# Patient Record
Sex: FEMALE | Race: WHITE | NOT HISPANIC OR LATINO | URBAN - METROPOLITAN AREA
[De-identification: names, ages, dates, MRNs, and addresses within clinical notes are randomized per-mention and may not be internally consistent; named-entity substitution may affect disease eponyms.]

---
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
## 2023-08-08 PROBLEM — J45.909 UNSPECIFIED ASTHMA, UNCOMPLICATED: Chronic | Status: ACTIVE | Noted: 2019-01-28

## 2023-08-08 PROBLEM — K25.9 GASTRIC ULCER, UNSPECIFIED AS ACUTE OR CHRONIC, WITHOUT HEMORRHAGE OR PERFORATION: Chronic | Status: ACTIVE | Noted: 2019-01-28

## 2023-08-18 ENCOUNTER — APPOINTMENT (OUTPATIENT)
Dept: OBGYN | Facility: CLINIC | Age: 23
End: 2023-08-18

## 2023-09-05 ENCOUNTER — APPOINTMENT (OUTPATIENT)
Dept: OBGYN | Facility: CLINIC | Age: 23
End: 2023-09-05
Payer: COMMERCIAL

## 2023-09-05 DIAGNOSIS — K25.9 GASTRIC ULCER, UNSPECIFIED AS ACUTE OR CHRONIC, W/OUT HEMORRHAGE OR PERFORATION: ICD-10-CM

## 2023-09-05 DIAGNOSIS — Z78.9 OTHER SPECIFIED HEALTH STATUS: ICD-10-CM

## 2023-09-05 DIAGNOSIS — F50.9 EATING DISORDER, UNSPECIFIED: ICD-10-CM

## 2023-09-05 DIAGNOSIS — Z84.89 FAMILY HISTORY OF OTHER SPECIFIED CONDITIONS: ICD-10-CM

## 2023-09-05 DIAGNOSIS — M81.8 EATING DISORDER, UNSPECIFIED: ICD-10-CM

## 2023-09-05 DIAGNOSIS — Z87.42 PERSONAL HISTORY OF OTHER DISEASES OF THE FEMALE GENITAL TRACT: ICD-10-CM

## 2023-09-05 DIAGNOSIS — Z30.011 ENCOUNTER FOR INITIAL PRESCRIPTION OF CONTRACEPTIVE PILLS: ICD-10-CM

## 2023-09-05 DIAGNOSIS — N91.2 EATING DISORDER, UNSPECIFIED: ICD-10-CM

## 2023-09-05 DIAGNOSIS — N94.3 PREMENSTRUAL TENSION SYNDROME: ICD-10-CM

## 2023-09-05 PROBLEM — Z00.00 ENCOUNTER FOR PREVENTIVE HEALTH EXAMINATION: Status: ACTIVE | Noted: 2023-09-05

## 2023-09-05 PROCEDURE — 99203 OFFICE O/P NEW LOW 30 MIN: CPT | Mod: 95

## 2023-09-06 PROBLEM — Z78.9: Status: ACTIVE | Noted: 2023-09-06

## 2023-09-06 PROBLEM — N94.3 PMS (PREMENSTRUAL SYNDROME): Status: ACTIVE | Noted: 2023-09-06

## 2023-09-06 PROBLEM — Z30.011 ENCOUNTER FOR PRESCRIPTION OF ORAL CONTRACEPTIVES: Status: ACTIVE | Noted: 2023-09-06

## 2023-09-06 PROBLEM — Z78.9 NON-SMOKER: Status: ACTIVE | Noted: 2023-09-06

## 2023-09-06 PROBLEM — K25.9: Status: RESOLVED | Noted: 2023-09-06 | Resolved: 2023-09-06

## 2023-09-06 PROBLEM — Z84.89 FAMILY HISTORY OF COMPLICATIONS DUE TO GENERAL ANESTHESIA: Status: ACTIVE | Noted: 2023-09-06

## 2023-09-06 PROBLEM — Z87.42 HISTORY OF IRREGULAR MENSTRUAL CYCLES: Status: RESOLVED | Noted: 2023-09-06 | Resolved: 2023-09-06

## 2023-09-06 PROBLEM — F50.9 FEMALE ATHLETE TRIAD SYNDROME: Status: RESOLVED | Noted: 2023-09-06 | Resolved: 2023-09-06

## 2023-09-06 RX ORDER — NORETHINDRONE ACETATE AND ETHINYL ESTRADIOL AND FERROUS FUMARATE 1MG-20(21)
1-20 KIT ORAL
Qty: 3 | Refills: 3 | Status: ACTIVE | COMMUNITY
Start: 2023-09-06 | End: 1900-01-01

## 2023-09-06 RX ORDER — NORETHINDRONE ACETATE AND ETHINYL ESTRADIOL AND FERROUS FUMARATE 1MG-20(21)
1-20 KIT ORAL
Qty: 84 | Refills: 0 | Status: ACTIVE | COMMUNITY
Start: 2023-07-26

## 2023-09-06 NOTE — HISTORY OF PRESENT ILLNESS
[Previously active] : previously active [No] : No [Patient would like to be screened for STIs] : Patient would like to be screened for STIs [FreeTextEntry1] : TAKING JUNEL FE 1/20 FOR SEVERAL YEARS, INITIALLY DUE TO IRREGULAR MENSES/AMENORRHEA, FEMALE ATHLETE TRIAD AS TEEN, MORE RECENTLY FOR CONTRACEPTION.  HAD SEEN ENDOCRINOLOGIST AT THAT TIME.  CONSIDERING STOPPING OCs, AS SHE IS NOT CURRENTLY S/A.   FEELS HER PMS MAY BE BETTER ON OCs.  HAS FATIGUE DURING PLACEBO WEEK.  MANAGEMENT OF PAP SMEAR AND D/W PATIENT.  OPTIONS INCLUDE YEARLY PAP VS. Q 3 YEARS.  RISKS OF OVER-TREATMENT OF BENIGN DISEASE VERSUS "MISSING" CERVICAL DISEASE DISCUSSED. SCREENING FOR, SIGNS AND SYMPTOMS OF AND PREVENTION OF STD'S D/W PATIENT.   [PapSmeardate] : 2022

## 2023-09-06 NOTE — REASON FOR VISIT
[Initial] : an initial consultation for [TextEntry] :  This visit was provided via telehealth using real-time 2-way audio visual technology. The patient,was located at WORK   at the time of the visit. The provider, NGUYEN GARCIA, was located at  HOME   at the time of the visit. The patient and Provider participated in the telehealth encounter. The patient gave consent.

## 2023-09-06 NOTE — PLAN
[FreeTextEntry1] : NEW PATIENT, PAP DONE 2022, ROR FOR PAP RESULTS. DESIRES OC D/C, FOR 2-3 MONTH F/U OFF OCs, EVALUATE FOR THYROID DYSFUNCTION, PCOS AFTER OC USE SUSPENDED.  F/U TFTs, PRL, FSH/LH AND E2 SERUM LEVELS. STD SCREENING. ALTERNATE CONTRACEPTIVE OPTIONS DISCUSSED PENDING RESUMPTION OF SEXUAL ACTIVITY.

## 2023-09-12 ENCOUNTER — APPOINTMENT (OUTPATIENT)
Dept: OBGYN | Facility: CLINIC | Age: 23
End: 2023-09-12

## 2024-06-11 ENCOUNTER — APPOINTMENT (OUTPATIENT)
Dept: OBGYN | Facility: CLINIC | Age: 24
End: 2024-06-11

## 2024-10-07 ENCOUNTER — NON-APPOINTMENT (OUTPATIENT)
Age: 24
End: 2024-10-07

## 2024-11-15 ENCOUNTER — APPOINTMENT (OUTPATIENT)
Dept: FAMILY MEDICINE | Facility: CLINIC | Age: 24
End: 2024-11-15

## 2024-11-15 ENCOUNTER — APPOINTMENT (OUTPATIENT)
Dept: INTERNAL MEDICINE | Facility: CLINIC | Age: 24
End: 2024-11-15

## 2024-11-15 ENCOUNTER — NON-APPOINTMENT (OUTPATIENT)
Age: 24
End: 2024-11-15

## 2024-11-15 VITALS
RESPIRATION RATE: 16 BRPM | HEART RATE: 56 BPM | SYSTOLIC BLOOD PRESSURE: 92 MMHG | BODY MASS INDEX: 21 KG/M2 | DIASTOLIC BLOOD PRESSURE: 64 MMHG | TEMPERATURE: 97.9 F | HEIGHT: 64 IN | WEIGHT: 123 LBS | OXYGEN SATURATION: 99 %

## 2024-11-15 DIAGNOSIS — Z12.83 ENCOUNTER FOR SCREENING FOR MALIGNANT NEOPLASM OF SKIN: ICD-10-CM

## 2024-11-15 DIAGNOSIS — Z13.1 ENCOUNTER FOR SCREENING FOR DIABETES MELLITUS: ICD-10-CM

## 2024-11-15 DIAGNOSIS — Z00.00 ENCOUNTER FOR GENERAL ADULT MEDICAL EXAMINATION W/OUT ABNORMAL FINDINGS: ICD-10-CM

## 2024-11-15 DIAGNOSIS — Z13.220 ENCOUNTER FOR SCREENING FOR LIPOID DISORDERS: ICD-10-CM

## 2024-11-15 DIAGNOSIS — Z02.1 ENCOUNTER FOR PRE-EMPLOYMENT EXAMINATION: ICD-10-CM

## 2024-11-15 PROCEDURE — 99385 PREV VISIT NEW AGE 18-39: CPT

## 2024-11-29 ENCOUNTER — APPOINTMENT (OUTPATIENT)
Dept: DERMATOLOGY | Facility: CLINIC | Age: 24
End: 2024-11-29